# Patient Record
Sex: MALE | Race: WHITE | NOT HISPANIC OR LATINO | Employment: FULL TIME | URBAN - METROPOLITAN AREA
[De-identification: names, ages, dates, MRNs, and addresses within clinical notes are randomized per-mention and may not be internally consistent; named-entity substitution may affect disease eponyms.]

---

## 2019-08-30 DIAGNOSIS — Z00.00 ROUTINE GENERAL MEDICAL EXAMINATION AT A HEALTH CARE FACILITY: Primary | ICD-10-CM

## 2019-10-02 ENCOUNTER — CLINICAL SUPPORT (OUTPATIENT)
Dept: INTERNAL MEDICINE | Facility: CLINIC | Age: 59
End: 2019-10-02
Attending: INTERNAL MEDICINE
Payer: COMMERCIAL

## 2019-10-02 ENCOUNTER — HOSPITAL ENCOUNTER (OUTPATIENT)
Dept: RADIOLOGY | Facility: HOSPITAL | Age: 59
Discharge: HOME | End: 2019-10-02
Attending: INTERNAL MEDICINE
Payer: COMMERCIAL

## 2019-10-02 ENCOUNTER — HOSPITAL ENCOUNTER (OUTPATIENT)
Dept: CARDIOLOGY | Facility: CLINIC | Age: 59
Discharge: HOME | End: 2019-10-02
Attending: INTERNAL MEDICINE
Payer: COMMERCIAL

## 2019-10-02 ENCOUNTER — CLINICAL SUPPORT (OUTPATIENT)
Dept: INTERNAL MEDICINE | Facility: CLINIC | Age: 59
End: 2019-10-02
Payer: COMMERCIAL

## 2019-10-02 ENCOUNTER — OFFICE VISIT (OUTPATIENT)
Dept: INTERNAL MEDICINE | Facility: CLINIC | Age: 59
End: 2019-10-02
Payer: COMMERCIAL

## 2019-10-02 VITALS
HEIGHT: 72 IN | WEIGHT: 181 LBS | SYSTOLIC BLOOD PRESSURE: 138 MMHG | BODY MASS INDEX: 24.52 KG/M2 | HEART RATE: 79 BPM | OXYGEN SATURATION: 99 % | DIASTOLIC BLOOD PRESSURE: 90 MMHG

## 2019-10-02 DIAGNOSIS — E78.49 OTHER HYPERLIPIDEMIA: ICD-10-CM

## 2019-10-02 DIAGNOSIS — R94.39 ABNORMAL STRESS TEST: ICD-10-CM

## 2019-10-02 DIAGNOSIS — Z00.00 ROUTINE GENERAL MEDICAL EXAMINATION AT A HEALTH CARE FACILITY: Primary | ICD-10-CM

## 2019-10-02 DIAGNOSIS — Z00.00 ROUTINE GENERAL MEDICAL EXAMINATION AT A HEALTH CARE FACILITY: ICD-10-CM

## 2019-10-02 DIAGNOSIS — E04.9 ENLARGED THYROID: ICD-10-CM

## 2019-10-02 DIAGNOSIS — Z00.00 ROUTINE PHYSICAL EXAMINATION: Primary | ICD-10-CM

## 2019-10-02 LAB
ALBUMIN SERPL BCP-MCNC: 4.5 G/DL (ref 3.5–5.2)
ALP SERPL-CCNC: 99 U/L (ref 55–135)
ALT SERPL W/O P-5'-P-CCNC: 38 U/L (ref 10–44)
ANION GAP SERPL CALC-SCNC: 10 MMOL/L (ref 8–16)
AST SERPL-CCNC: 26 U/L (ref 10–40)
BILIRUB SERPL-MCNC: 0.4 MG/DL (ref 0.1–1)
BUN SERPL-MCNC: 16 MG/DL (ref 6–20)
CALCIUM SERPL-MCNC: 10.1 MG/DL (ref 8.7–10.5)
CHLORIDE SERPL-SCNC: 101 MMOL/L (ref 95–110)
CHOLEST SERPL-MCNC: 249 MG/DL (ref 120–199)
CHOLEST/HDLC SERPL: 5.7 {RATIO} (ref 2–5)
CO2 SERPL-SCNC: 26 MMOL/L (ref 23–29)
COMPLEXED PSA SERPL-MCNC: 1.8 NG/ML (ref 0–4)
CREAT SERPL-MCNC: 1 MG/DL (ref 0.5–1.4)
CV STRESS BASE HR: 68 BPM
DIASTOLIC BLOOD PRESSURE: 91 MMHG
ERYTHROCYTE [DISTWIDTH] IN BLOOD BY AUTOMATED COUNT: 13.1 % (ref 11.5–14.5)
EST. GFR  (AFRICAN AMERICAN): >60 ML/MIN/1.73 M^2
EST. GFR  (NON AFRICAN AMERICAN): >60 ML/MIN/1.73 M^2
ESTIMATED AVG GLUCOSE: 103 MG/DL (ref 68–131)
GLUCOSE SERPL-MCNC: 97 MG/DL (ref 70–110)
HBA1C MFR BLD HPLC: 5.2 % (ref 4–5.6)
HCT VFR BLD AUTO: 49 % (ref 40–54)
HCV AB SERPL QL IA: NEGATIVE
HDLC SERPL-MCNC: 44 MG/DL (ref 40–75)
HDLC SERPL: 17.7 % (ref 20–50)
HGB BLD-MCNC: 16.2 G/DL (ref 14–18)
HIV 1+2 AB+HIV1 P24 AG SERPL QL IA: NEGATIVE
LDLC SERPL CALC-MCNC: 186.6 MG/DL (ref 63–159)
MCH RBC QN AUTO: 29.3 PG (ref 27–31)
MCHC RBC AUTO-ENTMCNC: 33.1 G/DL (ref 32–36)
MCV RBC AUTO: 89 FL (ref 82–98)
NONHDLC SERPL-MCNC: 205 MG/DL
OHS CV CPX 1 MINUTE RECOVERY HEART RATE: 162 BPM
OHS CV CPX 85 PERCENT MAX PREDICTED HEART RATE MALE: 137
OHS CV CPX ESTIMATED METS: 16
OHS CV CPX MAX PREDICTED HEART RATE: 161
OHS CV CPX PATIENT IS FEMALE: 0
OHS CV CPX PATIENT IS MALE: 1
OHS CV CPX PEAK DIASTOLIC BLOOD PRESSURE: 64 MMHG
OHS CV CPX PEAK HEAR RATE: 181 BPM
OHS CV CPX PEAK RATE PRESSURE PRODUCT: NORMAL
OHS CV CPX PEAK SYSTOLIC BLOOD PRESSURE: 190 MMHG
OHS CV CPX PERCENT MAX PREDICTED HEART RATE ACHIEVED: 112
OHS CV CPX RATE PRESSURE PRODUCT PRESENTING: 9928
PLATELET # BLD AUTO: 344 K/UL (ref 150–350)
PMV BLD AUTO: 8.6 FL (ref 9.2–12.9)
POTASSIUM SERPL-SCNC: 4 MMOL/L (ref 3.5–5.1)
PROT SERPL-MCNC: 8 G/DL (ref 6–8.4)
RBC # BLD AUTO: 5.53 M/UL (ref 4.6–6.2)
SODIUM SERPL-SCNC: 137 MMOL/L (ref 136–145)
STRESS ECHO POST EXERCISE DUR MIN: 9 MINUTES
STRESS ECHO POST EXERCISE DUR SEC: 21 SECONDS
STRESS ECHO TARGET HR: 136.85 BPM
STRESS ST DEPRESSION: 2 MM
SYSTOLIC BLOOD PRESSURE: 146 MMHG
T4 FREE SERPL-MCNC: 1.07 NG/DL (ref 0.71–1.51)
TRIGL SERPL-MCNC: 92 MG/DL (ref 30–150)
TSH SERPL DL<=0.005 MIU/L-ACNC: 0.21 UIU/ML (ref 0.4–4)
WBC # BLD AUTO: 5.72 K/UL (ref 3.9–12.7)

## 2019-10-02 PROCEDURE — 99386 PREV VISIT NEW AGE 40-64: CPT | Mod: S$GLB,,, | Performed by: INTERNAL MEDICINE

## 2019-10-02 PROCEDURE — 99999 PR PBB SHADOW E&M-EST. PATIENT-LVL III: CPT | Mod: PBBFAC,,, | Performed by: INTERNAL MEDICINE

## 2019-10-02 PROCEDURE — 99386 PR PREVENTIVE VISIT,NEW,40-64: ICD-10-PCS | Mod: S$GLB,,, | Performed by: INTERNAL MEDICINE

## 2019-10-02 PROCEDURE — 99999 PR PBB SHADOW E&M-EST. PATIENT-LVL III: ICD-10-PCS | Mod: PBBFAC,,, | Performed by: INTERNAL MEDICINE

## 2019-10-02 PROCEDURE — 84153 ASSAY OF PSA TOTAL: CPT

## 2019-10-02 PROCEDURE — 85027 COMPLETE CBC AUTOMATED: CPT

## 2019-10-02 PROCEDURE — 86803 HEPATITIS C AB TEST: CPT

## 2019-10-02 PROCEDURE — 80061 LIPID PANEL: CPT

## 2019-10-02 PROCEDURE — 71046 XR CHEST PA AND LATERAL: ICD-10-PCS | Mod: 26,,, | Performed by: RADIOLOGY

## 2019-10-02 PROCEDURE — 71046 X-RAY EXAM CHEST 2 VIEWS: CPT | Mod: 26,,, | Performed by: RADIOLOGY

## 2019-10-02 PROCEDURE — 86703 HIV-1/HIV-2 1 RESULT ANTBDY: CPT

## 2019-10-02 PROCEDURE — 84439 ASSAY OF FREE THYROXINE: CPT

## 2019-10-02 PROCEDURE — 84443 ASSAY THYROID STIM HORMONE: CPT

## 2019-10-02 PROCEDURE — 93015 EXERCISE STRESS - EKG (CUPID ONLY): ICD-10-PCS | Mod: S$GLB,,, | Performed by: INTERNAL MEDICINE

## 2019-10-02 PROCEDURE — 93015 CV STRESS TEST SUPVJ I&R: CPT | Mod: S$GLB,,, | Performed by: INTERNAL MEDICINE

## 2019-10-02 PROCEDURE — 80053 COMPREHEN METABOLIC PANEL: CPT

## 2019-10-02 PROCEDURE — 71046 X-RAY EXAM CHEST 2 VIEWS: CPT | Mod: TC,FY

## 2019-10-02 PROCEDURE — 83036 HEMOGLOBIN GLYCOSYLATED A1C: CPT

## 2019-10-02 RX ORDER — BUTALBITAL, ASPIRIN, AND CAFFEINE 325; 50; 40 MG/1; MG/1; MG/1
1 CAPSULE ORAL EVERY 4 HOURS PRN
COMMUNITY

## 2019-10-02 NOTE — PROGRESS NOTES
Nutrition Assessment  Client name:  Alex Anderson    (Annual  physical)  :  1960  Age:  59 y.o.  Gender:  male    Client states:  Has history of elevated Cholesterol without medication and visits his PCP every 3 months for blood work. 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 This past month his travel days were higher than usual, and for lunch he dines at [employer] and orders the Crunch Wrap Supreme, whereas he can eat and drive in the car. A second fast food choice is [employer] and he orders Whopper with fries or onion rings. He is the cook in the family and shares that he prepares skinless chicken, one pot meals, uses olive oil for cooking and baking, and little salt added in cooking. He does use coconut oil infrequently should a recipe call for it. His wife does not like wheat bread, brown rice nor fish, so therefore he concedes, as he does not want to purchase 2 types of rice and bread. He grew up eating desserts after dinner, and as an adult enjoys baking from scratch and enjoys homemade or store purchased bakery items 4x/wk. Shares that dessert is often consumed between 9-10 pm which he states is an unhealthy practice. Has history of Diverticulitis in  and has decreased amount and frequency of nuts and chews them well. Currently is taking Adult and pre-briana vitamins as he bites his nails and were not growing, therefore his wife suggested he take pre-briana vitamins and now his nails are healthy. In high school he weighed 136# and shares that he has a fast metabolism, however would like to lose wt. and states that walking would assist him with accomplishing this goal.      Anthropometrics  Height:  6'     Weight:  181  BMI:  24.55  % Body Fat:  unknown    Clinical Signs/Symptoms  N/V/D:  none  Appetite (Good, Fair, or Poor):  good      PMH: Diverticulitis ,  Hyperlipidemia    No past surgical history on file.    Medications    has a current medication list which includes the following prescription(s): butalbital-aspirin-caffeine -40 mg.    Vitamins, Minerals, and/or Supplements:  Omega 3, 1280 mg, 50+ MVI, Daily fiber, Echinacea - 1800 mg, Vitamin C 1500 mg, L-arginine - 1000 mg, ASA, Zinc, Vitamin E, Prenatal, D3 - 50 mg, Magnesium    Food/Medication Interactions:  Reviewed     Food Allergies or Intolerances:  none     Social History    Marital status:    Employment:  [employer] -     Social History     Tobacco Use    Smoking status: No   Substance Use Topics    Alcohol use: 2 beers per year         Lab Reports   Total Cholesterol:  249    Triglycerides:  92  HDL:  44  LDL:  186.6   Glucose:  97  HbA1c:  5.2  BP:  138/90     Food History  Breakfast:  16 oz coffee + favored creamer  Mid-morning Snack:  water  Lunch:  Turkey and cheese on white, handful chips, water or crunchy wrap supreme or whopper with fries    Mid-afternoon Snack:  Protein bar or cookies  Dinner:  White bean chili with white rice, water  H.S. Snack:  Cake or cookies or cheese it snapz  *Fluid intake:  Coffee, water, Dr. Pepper every 2 wks    Exercise History:  No formal exercise plan. Climbs stairs 5-6x/day, mows lawn    Cultural/Spiritual/Personal Preferences:  None identified    Support System:  wife    State of Change:  contemplation    Barriers to Change:  Time constraints, enjoys fast food and denial of role in increasing lipids, lack of adequate exercise    Diagnosis    Altered nutrition related laboratory values related to fast food meals, imbalanced meals, and inadequate physical activity as evidenced by Chol: 249, LDL: 186.6 and HDL: 44.    Intervention    RMR (Method:  [facility]):  1675 kcal  Activity Factor:  1.3  PAULINE:  2178 - 125 = 2053    Goals:  1.  Discontinue 50+ MVI  2.  Daily fluid goal: 90 oz heathy fluids  3.  Consider brisk walking routine  of 150 minutes/weekly  4.  Decrease fat and sugar intake by 50%  5.  Utilize fast food guide as resource when ordering lunch on the go  6.  Evening snacking 4 hrs. Prior to bedtime - options discussed    Nutrition Education  Reviewed and explained laboratory results and noted elevations in Cholesterol, LDL and sup-optimal level of HDL. Following review, client stated that he has his blood tested every 3 months. Discussed sources of saturated and trans fats and healthier plant sources to improve Cholesterol and increase HDL. Explained that consumption of fast food is contributing to lipid elevations. Discussed the Fast Food and Lite Restaurant dining guides. Reviewed specifically healthier options from [employer] an [employer]. Encouraged choices from [employer] - client replied does not drink Smoothies that often. Explained the difference between daily activity and aerobic exercise and the benefit on lowering body fat and blood lipids. Has knowledge that whole grains and fish are better choices, however chooses to consume white rice/bread and no fish, except tuna salad occasionally. Explained a healthy margarine brand and why with emphasis that it is important to evaluate all types of fat in the margarine. Explained the Am. Heart Association's stance on coconut oil. Reviewed guidelines and brand of healthy protein snack bars to substitute for cakes and cookies and the role of fiber in food. Encouraged vegetables at lunch and dinner and client did not respond. Calculated daily fluid goal and explained the benefit of hydration to adipose loss. Suggested 2% cheese, and low sugar greek yogurt and good options for chips. Client was polite, however Interest and behavioral change is questionable, as observed client looking at his watch 3 times during the consultation.    Patient verbalized understanding of nutrition education and recommendations received.    Handouts Provided  Meal Planning Guide  Restaurant Guide  Eat  Fit Shopping List  Eat Fit Mary  Fast Food Guide  Vitamin/Mineral Guide  AAND - Choosing Heart Healthy Fats    Monitoring/Evaluation    Monitor the following:  Weight  BMI  % Body Fat  Caloric intake  Labs:  Lipids    Follow Up Plan:  Communication with referring healthcare provider is unnecessary at this time as patient presented as part of annual wellness exam.  However, will follow up with patient in 1-2 years.

## 2019-10-02 NOTE — LETTER
October 2, 2019    Alex Anderson  [address] [facility] [place] [address]             [facility]  [address]  [facility] [address]  Phone: [phone]  Fax: [phone] Dear Mr. Anderson:        Thank you for allowing me to serve you and perform your Executive Health exam on 10/2/2019.  This letter will serve a brief summary of the history, physical findings, and laboratory/studies performed and recommendations at that time.    Reason for Visit: Executive Health Preventive Physical Examination    Subjective:       Patient ID: Alex Anderson is a 59 y.o. male.    Chief Complaint: Executive Health    HPI:  Patient new to me, here for executive health physical.  59-year-old male from [facility] says he has a wonderful primary care doctor at home who has been seeing for over 15 years.  Patient says he has been in good shape, works hard.  Has a history of mild cholesterol and maybe a thyroid problem some years ago but has done well and felt well.  We updated personal and family history.    I reviewed his chest x-ray which showed no acute abnormalities.  Stress test however showed abnormal or positive for ischemia but there are comments that because of the patient's heavy workload on the test that this may be a false positive.  Patient's labs show mildly elevated cholesterol, mildly decreased TSH in stable free T4.  Other labs were stable or unremarkable.  Patient denies any problems with palpitations or diarrhea.  No chest pains or shortness of breath.  He says the stress test went well.  We discussed this at length including seeing Cardiology, doing further test on the thyroid and treating his lipids and borderline blood pressure he would like to review this with his primary doctor of over 15 years.  I do not feel the patient is in any acute distress and I think that is a reasonable plan. I will provide him with copies of all of his results now and through [facility].    Review  of Systems   Constitutional: Negative for chills, fatigue, fever and unexpected weight change.   HENT: Negative for nosebleeds and trouble swallowing.    Eyes: Negative for pain and visual disturbance.   Respiratory: Negative for cough, shortness of breath and wheezing.    Cardiovascular: Negative for chest pain and palpitations.   Gastrointestinal: Negative for abdominal pain, constipation, diarrhea, nausea and vomiting.   Genitourinary: Negative for difficulty urinating and hematuria.   Musculoskeletal: Negative for neck pain.   Skin: Negative for rash.   Neurological: Negative for dizziness and headaches.   Hematological: Does not bruise/bleed easily.   Psychiatric/Behavioral: Negative for dysphoric mood, sleep disturbance and suicidal ideas.       Objective:      Physical Exam   Constitutional: He is oriented to person, place, and time. He appears well-developed and well-nourished. No distress.   HENT:   Head: Normocephalic and atraumatic.   Right Ear: External ear normal.   Left Ear: External ear normal.   Mouth/Throat: Oropharynx is clear and moist. No oropharyngeal exudate.   TM's clear, pharynx clear   Eyes: Pupils are equal, round, and reactive to light. Conjunctivae and EOM are normal. No scleral icterus.   Neck: Normal range of motion. Neck supple. Thyromegaly (L greater than R) present.   No supraclavicular nodes palpated   Cardiovascular: Normal rate, regular rhythm and normal heart sounds.   No murmur heard.  Pulmonary/Chest: Effort normal and breath sounds normal. He has no wheezes.   Abdominal: Soft. Bowel sounds are normal. He exhibits no mass. There is no tenderness.   Musculoskeletal: He exhibits no edema.   Lymphadenopathy:     He has no cervical adenopathy.   Neurological: He is alert and oriented to person, place, and time.   Skin: No rash noted. No erythema. No pallor.   Psychiatric: He has a normal mood and affect. His behavior is normal.       Assessment:       1. Routine physical  examination    2. Other hyperlipidemia    3. Abnormal stress test    4. Enlarged thyroid        Plan:       Alex was seen today for [facility].    Diagnoses and all orders for this visit:    Routine physical examination    Other hyperlipidemia    Abnormal stress test    Enlarged thyroid          Patient decided to discuss this with his PCP.  May need repeat thyroid studies, thyroid ultrasound, treatment of lipids, follow-up of blood pressure and possible Cardiology consult versus other studies to evaluate abnormal stress test.     Labs:  Results for orders placed or performed in visit on 10/02/19   Comprehensive metabolic panel   Result Value Ref Range    Sodium 137 136 - 145 mmol/L    Potassium 4.0 3.5 - 5.1 mmol/L    Chloride 101 95 - 110 mmol/L    CO2 26 23 - 29 mmol/L    Glucose 97 70 - 110 mg/dL    BUN, Bld 16 6 - 20 mg/dL    Creatinine 1.0 0.5 - 1.4 mg/dL    Calcium 10.1 8.7 - 10.5 mg/dL    Total Protein 8.0 6.0 - 8.4 g/dL    Albumin 4.5 3.5 - 5.2 g/dL    Total Bilirubin 0.4 0.1 - 1.0 mg/dL    Alkaline Phosphatase 99 55 - 135 U/L    AST 26 10 - 40 U/L    ALT 38 10 - 44 U/L    Anion Gap 10 8 - 16 mmol/L    eGFR if African American >60.0 >60 mL/min/1.73 m^2    eGFR if non African American >60.0 >60 mL/min/1.73 m^2   CBC Without Differential   Result Value Ref Range    WBC 5.72 3.90 - 12.70 K/uL    RBC 5.53 4.60 - 6.20 M/uL    Hemoglobin 16.2 14.0 - 18.0 g/dL    Hematocrit 49.0 40.0 - 54.0 %    Mean Corpuscular Volume 89 82 - 98 fL    Mean Corpuscular Hemoglobin 29.3 27.0 - 31.0 pg    Mean Corpuscular Hemoglobin Conc 33.1 32.0 - 36.0 g/dL    RDW 13.1 11.5 - 14.5 %    Platelets 344 150 - 350 K/uL    MPV 8.6 (L) 9.2 - 12.9 fL   Lipid panel   Result Value Ref Range    Cholesterol 249 (H) 120 - 199 mg/dL    Triglycerides 92 30 - 150 mg/dL    HDL 44 40 - 75 mg/dL    LDL Cholesterol 186.6 (H) 63.0 - 159.0 mg/dL    Hdl/Cholesterol Ratio 17.7 (L) 20.0 - 50.0 %    Total Cholesterol/HDL Ratio 5.7 (H) 2.0 - 5.0     Non-HDL Cholesterol 205 mg/dL   TSH   Result Value Ref Range    TSH 0.209 (L) 0.400 - 4.000 uIU/mL   PSA, Screening (every year)   Result Value Ref Range    PSA, SCREEN 1.8 0.00 - 4.00 ng/mL   Hemoglobin A1c   Result Value Ref Range    Hemoglobin A1C 5.2 4.0 - 5.6 %    Estimated Avg Glucose 103 68 - 131 mg/dL   Hepatitis C antibody   Result Value Ref Range    Hepatitis C Ab Negative Negative   HIV 1/2 Ag/Ab (4th Gen)   Result Value Ref Range    HIV 1/2 Ag/Ab Negative Negative   T4, free   Result Value Ref Range    Free T4 1.07 0.71 - 1.51 ng/dL          Assessment/Recommendations:  Routine Health Maintenance    At this time, you appear to be in good medical condition.  I look forward to seeing you again next year.  Please contact me should you have any questions or concerns regarding physical findings, or my recommendations.              If you have any questions or concerns, please don't hesitate to call.    Sincerely,        Lobito Ramos MD

## 2019-10-02 NOTE — PROGRESS NOTES
Subjective:       Patient ID: Alex Anderson is a 59 y.o. male.    Chief Complaint: Executive Health    HPI:  Patient new to me, here for [facility] physical.  59-year-old male from [facility] says he has a wonderful primary care doctor at home who has been seeing for over 15 years.  Patient says he has been in good shape, works hard.  Has a history of mild cholesterol and maybe a thyroid problem some years ago but has done well and felt well.  We updated personal and family history.    I reviewed his chest x-ray which showed no acute abnormalities.  Stress test however showed abnormal or positive for ischemia but there are comments that because of the patient's heavy workload on the test that this may be a false positive.  Patient's labs show mildly elevated cholesterol, mildly decreased TSH in stable free T4.  Other labs were stable or unremarkable.  Patient denies any problems with palpitations or diarrhea.  No chest pains or shortness of breath.  He says the stress test went well.  We discussed this at length including seeing Cardiology, doing further test on the thyroid and treating his lipids and borderline blood pressure he would like to review this with his primary doctor of over 15 years.  I do not feel the patient is in any acute distress and I think that is a reasonable plan. I will provide him with copies of all of his results now and through [facility].    Review of Systems   Constitutional: Negative for chills, fatigue, fever and unexpected weight change.   HENT: Negative for nosebleeds and trouble swallowing.    Eyes: Negative for pain and visual disturbance.   Respiratory: Negative for cough, shortness of breath and wheezing.    Cardiovascular: Negative for chest pain and palpitations.   Gastrointestinal: Negative for abdominal pain, constipation, diarrhea, nausea and vomiting.   Genitourinary: Negative for difficulty urinating and hematuria.   Musculoskeletal: Negative for neck  pain.   Skin: Negative for rash.   Neurological: Negative for dizziness and headaches.   Hematological: Does not bruise/bleed easily.   Psychiatric/Behavioral: Negative for dysphoric mood, sleep disturbance and suicidal ideas.       Objective:      Physical Exam   Constitutional: He is oriented to person, place, and time. He appears well-developed and well-nourished. No distress.   HENT:   Head: Normocephalic and atraumatic.   Right Ear: External ear normal.   Left Ear: External ear normal.   Mouth/Throat: Oropharynx is clear and moist. No oropharyngeal exudate.   TM's clear, pharynx clear   Eyes: Pupils are equal, round, and reactive to light. Conjunctivae and EOM are normal. No scleral icterus.   Neck: Normal range of motion. Neck supple. Thyromegaly (L greater than R) present.   No supraclavicular nodes palpated   Cardiovascular: Normal rate, regular rhythm and normal heart sounds.   No murmur heard.  Pulmonary/Chest: Effort normal and breath sounds normal. He has no wheezes.   Abdominal: Soft. Bowel sounds are normal. He exhibits no mass. There is no tenderness.   Musculoskeletal: He exhibits no edema.   Lymphadenopathy:     He has no cervical adenopathy.   Neurological: He is alert and oriented to person, place, and time.   Skin: No rash noted. No erythema. No pallor.   Psychiatric: He has a normal mood and affect. His behavior is normal.       Assessment:       1. Routine physical examination    2. Other hyperlipidemia    3. Abnormal stress test    4. Enlarged thyroid        Plan:       Alex was seen today for [facility].    Diagnoses and all orders for this visit:    Routine physical examination    Other hyperlipidemia    Abnormal stress test    Enlarged thyroid          Patient decided to discuss this with his PCP.  May need repeat thyroid studies, thyroid ultrasound, treatment of lipids, follow-up of blood pressure and possible Cardiology consult versus other studies to evaluate abnormal stress  test.

## 2020-12-30 ENCOUNTER — CLINICAL SUPPORT (OUTPATIENT)
Dept: INTERNAL MEDICINE | Facility: CLINIC | Age: 60
End: 2020-12-30
Payer: COMMERCIAL

## 2020-12-30 ENCOUNTER — OFFICE VISIT (OUTPATIENT)
Dept: INTERNAL MEDICINE | Facility: CLINIC | Age: 60
End: 2020-12-30
Payer: COMMERCIAL

## 2020-12-30 ENCOUNTER — PATIENT MESSAGE (OUTPATIENT)
Dept: INTERNAL MEDICINE | Facility: CLINIC | Age: 60
End: 2020-12-30

## 2020-12-30 ENCOUNTER — HOSPITAL ENCOUNTER (OUTPATIENT)
Dept: CARDIOLOGY | Facility: CLINIC | Age: 60
Discharge: HOME | End: 2020-12-30
Payer: COMMERCIAL

## 2020-12-30 VITALS
OXYGEN SATURATION: 99 % | WEIGHT: 182.75 LBS | HEIGHT: 72 IN | SYSTOLIC BLOOD PRESSURE: 120 MMHG | DIASTOLIC BLOOD PRESSURE: 90 MMHG | BODY MASS INDEX: 24.75 KG/M2 | HEART RATE: 66 BPM

## 2020-12-30 DIAGNOSIS — Z00.00 ROUTINE PHYSICAL EXAMINATION: Primary | ICD-10-CM

## 2020-12-30 DIAGNOSIS — R97.20 ELEVATED PSA: ICD-10-CM

## 2020-12-30 DIAGNOSIS — E78.49 OTHER HYPERLIPIDEMIA: ICD-10-CM

## 2020-12-30 DIAGNOSIS — Z00.00 ANNUAL PHYSICAL EXAM: Primary | ICD-10-CM

## 2020-12-30 DIAGNOSIS — Z00.00 ROUTINE GENERAL MEDICAL EXAMINATION AT A HEALTH CARE FACILITY: ICD-10-CM

## 2020-12-30 DIAGNOSIS — Z00.00 ROUTINE GENERAL MEDICAL EXAMINATION AT A HEALTH CARE FACILITY: Primary | ICD-10-CM

## 2020-12-30 LAB
ALBUMIN SERPL BCP-MCNC: 4.2 G/DL (ref 3.5–5.2)
ALP SERPL-CCNC: 95 U/L (ref 55–135)
ALT SERPL W/O P-5'-P-CCNC: 29 U/L (ref 10–44)
ANION GAP SERPL CALC-SCNC: 7 MMOL/L (ref 8–16)
AST SERPL-CCNC: 24 U/L (ref 10–40)
BILIRUB SERPL-MCNC: 0.6 MG/DL (ref 0.1–1)
BUN SERPL-MCNC: 18 MG/DL (ref 6–20)
CALCIUM SERPL-MCNC: 9.3 MG/DL (ref 8.7–10.5)
CHLORIDE SERPL-SCNC: 102 MMOL/L (ref 95–110)
CHOLEST SERPL-MCNC: 205 MG/DL (ref 120–199)
CHOLEST/HDLC SERPL: 3.9 {RATIO} (ref 2–5)
CO2 SERPL-SCNC: 26 MMOL/L (ref 23–29)
COMPLEXED PSA SERPL-MCNC: 6.1 NG/ML (ref 0–4)
CREAT SERPL-MCNC: 1 MG/DL (ref 0.5–1.4)
ERYTHROCYTE [DISTWIDTH] IN BLOOD BY AUTOMATED COUNT: 13.3 % (ref 11.5–14.5)
EST. GFR  (AFRICAN AMERICAN): >60 ML/MIN/1.73 M^2
EST. GFR  (NON AFRICAN AMERICAN): >60 ML/MIN/1.73 M^2
ESTIMATED AVG GLUCOSE: 105 MG/DL (ref 68–131)
GLUCOSE SERPL-MCNC: 99 MG/DL (ref 70–110)
HBA1C MFR BLD HPLC: 5.3 % (ref 4–5.6)
HCT VFR BLD AUTO: 47.7 % (ref 40–54)
HCV AB SERPL QL IA: NEGATIVE
HDLC SERPL-MCNC: 52 MG/DL (ref 40–75)
HDLC SERPL: 25.4 % (ref 20–50)
HGB BLD-MCNC: 15.4 G/DL (ref 14–18)
LDLC SERPL CALC-MCNC: 137.8 MG/DL (ref 63–159)
MCH RBC QN AUTO: 29 PG (ref 27–31)
MCHC RBC AUTO-ENTMCNC: 32.3 G/DL (ref 32–36)
MCV RBC AUTO: 90 FL (ref 82–98)
NONHDLC SERPL-MCNC: 153 MG/DL
PLATELET # BLD AUTO: 345 K/UL (ref 150–350)
PMV BLD AUTO: 8.8 FL (ref 9.2–12.9)
POTASSIUM SERPL-SCNC: 4.1 MMOL/L (ref 3.5–5.1)
PROT SERPL-MCNC: 7.6 G/DL (ref 6–8.4)
RBC # BLD AUTO: 5.31 M/UL (ref 4.6–6.2)
SODIUM SERPL-SCNC: 135 MMOL/L (ref 136–145)
T4 FREE SERPL-MCNC: 1.09 NG/DL (ref 0.71–1.51)
TRIGL SERPL-MCNC: 76 MG/DL (ref 30–150)
TSH SERPL DL<=0.005 MIU/L-ACNC: 0.23 UIU/ML (ref 0.4–4)
WBC # BLD AUTO: 6.17 K/UL (ref 3.9–12.7)

## 2020-12-30 PROCEDURE — 83036 HEMOGLOBIN GLYCOSYLATED A1C: CPT

## 2020-12-30 PROCEDURE — 84439 ASSAY OF FREE THYROXINE: CPT

## 2020-12-30 PROCEDURE — 97750 PHYSICAL PERFORMANCE TEST: CPT | Mod: S$GLB,,, | Performed by: INTERNAL MEDICINE

## 2020-12-30 PROCEDURE — 97750 PR PHYSICAL PERFORMANCE TEST: ICD-10-PCS | Mod: S$GLB,,, | Performed by: INTERNAL MEDICINE

## 2020-12-30 PROCEDURE — 99999 PR PBB SHADOW E&M-EST. PATIENT-LVL I: CPT | Mod: PBBFAC,,,

## 2020-12-30 PROCEDURE — 80061 LIPID PANEL: CPT

## 2020-12-30 PROCEDURE — 84153 ASSAY OF PSA TOTAL: CPT

## 2020-12-30 PROCEDURE — 3008F BODY MASS INDEX DOCD: CPT | Mod: CPTII,S$GLB,, | Performed by: INTERNAL MEDICINE

## 2020-12-30 PROCEDURE — 85027 COMPLETE CBC AUTOMATED: CPT

## 2020-12-30 PROCEDURE — 99396 PR PREVENTIVE VISIT,EST,40-64: ICD-10-PCS | Mod: S$GLB,,, | Performed by: INTERNAL MEDICINE

## 2020-12-30 PROCEDURE — 99999 PR PBB SHADOW E&M-EST. PATIENT-LVL III: ICD-10-PCS | Mod: PBBFAC,,, | Performed by: INTERNAL MEDICINE

## 2020-12-30 PROCEDURE — 93005 EKG 12-LEAD: ICD-10-PCS | Mod: S$GLB,,, | Performed by: INTERNAL MEDICINE

## 2020-12-30 PROCEDURE — 93005 ELECTROCARDIOGRAM TRACING: CPT | Mod: S$GLB,,, | Performed by: INTERNAL MEDICINE

## 2020-12-30 PROCEDURE — 36415 COLL VENOUS BLD VENIPUNCTURE: CPT

## 2020-12-30 PROCEDURE — 86803 HEPATITIS C AB TEST: CPT

## 2020-12-30 PROCEDURE — 99999 PR PBB SHADOW E&M-EST. PATIENT-LVL I: ICD-10-PCS | Mod: PBBFAC,,,

## 2020-12-30 PROCEDURE — 93010 ELECTROCARDIOGRAM REPORT: CPT | Mod: S$GLB,,, | Performed by: INTERNAL MEDICINE

## 2020-12-30 PROCEDURE — 1126F AMNT PAIN NOTED NONE PRSNT: CPT | Mod: S$GLB,,, | Performed by: INTERNAL MEDICINE

## 2020-12-30 PROCEDURE — 3008F PR BODY MASS INDEX (BMI) DOCUMENTED: ICD-10-PCS | Mod: CPTII,S$GLB,, | Performed by: INTERNAL MEDICINE

## 2020-12-30 PROCEDURE — 99396 PREV VISIT EST AGE 40-64: CPT | Mod: S$GLB,,, | Performed by: INTERNAL MEDICINE

## 2020-12-30 PROCEDURE — 80053 COMPREHEN METABOLIC PANEL: CPT

## 2020-12-30 PROCEDURE — 1126F PR PAIN SEVERITY QUANTIFIED, NO PAIN PRESENT: ICD-10-PCS | Mod: S$GLB,,, | Performed by: INTERNAL MEDICINE

## 2020-12-30 PROCEDURE — 84443 ASSAY THYROID STIM HORMONE: CPT

## 2020-12-30 PROCEDURE — 99999 PR PBB SHADOW E&M-EST. PATIENT-LVL III: CPT | Mod: PBBFAC,,, | Performed by: INTERNAL MEDICINE

## 2020-12-30 PROCEDURE — 93010 EKG 12-LEAD: ICD-10-PCS | Mod: S$GLB,,, | Performed by: INTERNAL MEDICINE

## 2020-12-30 RX ORDER — ATORVASTATIN CALCIUM 10 MG/1
TABLET, FILM COATED ORAL
COMMUNITY
Start: 2020-04-03

## 2020-12-30 NOTE — LETTER
December 30, 2020    Alex Anderson  [address] [facility] [place] [address]             [facility]  [address]  [facility] [address]  Phone: [phone]  Fax: [phone] Dear Mr. Anderson:        Thank you for allowing me to serve you and perform your Executive Health exam on 12/30/2020.  This letter will serve a brief summary of the history, physical findings, and laboratory/studies performed and recommendations at that time.    Reason for Visit: Executive Health Preventive Physical Examination    Subjective:       Patient ID: Alex Anderson is a 60 y.o. male.    Chief Complaint: Executive Health    Patient here for executive health physical.  He recently also had a checkup with his PCP and is seeing a urologist in a few weeks because his PSA was elevated.  His PSA at the time was 6.7.  It is 6.1 today and he was not treated in any way.  He says he did have intercourse the night before the 1st blood test was drawn and he was told that could have played a role.  I suspect should have improved a good bit more by now but he is plugged in with urology so we will see.  Lipids are improved back on cholesterol medicine.  Other labs are stable although we are waiting on his hepatitis-C screening.  Preliminary EKG is also normal and unchanged.  No problems with the COVID pandemic.  He is primarily working from home    Review of Systems   Constitutional: Negative for chills, fatigue and fever.   HENT: Negative for nosebleeds and trouble swallowing.    Eyes: Negative for pain and visual disturbance.   Respiratory: Negative for cough, shortness of breath and wheezing.    Cardiovascular: Negative for chest pain and palpitations.   Gastrointestinal: Negative for abdominal pain, constipation, diarrhea, nausea and vomiting.   Genitourinary: Negative for difficulty urinating and hematuria.   Musculoskeletal: Negative for arthralgias, back pain and neck pain.   Integumentary:  Negative for rash.    Neurological: Negative for dizziness and headaches.   Hematological: Does not bruise/bleed easily.   Psychiatric/Behavioral: Negative for dysphoric mood and sleep disturbance.         Objective:      Physical Exam  Constitutional:       General: He is not in acute distress.     Appearance: He is well-developed.   HENT:      Head: Normocephalic and atraumatic.      Right Ear: Tympanic membrane, ear canal and external ear normal.      Left Ear: Tympanic membrane, ear canal and external ear normal.      Mouth/Throat:      Pharynx: No oropharyngeal exudate or posterior oropharyngeal erythema.   Eyes:      General: No scleral icterus.     Conjunctiva/sclera: Conjunctivae normal.      Pupils: Pupils are equal, round, and reactive to light.   Neck:      Musculoskeletal: Normal range of motion and neck supple.      Thyroid: No thyromegaly.      Comments: No supraclavicular nodes palpated  Cardiovascular:      Rate and Rhythm: Normal rate and regular rhythm.      Pulses: Normal pulses.      Heart sounds: Normal heart sounds. No murmur.   Pulmonary:      Effort: Pulmonary effort is normal.      Breath sounds: Normal breath sounds. No wheezing.   Abdominal:      General: Bowel sounds are normal.      Palpations: Abdomen is soft. There is no mass.      Tenderness: There is no abdominal tenderness.   Musculoskeletal:         General: No tenderness.      Right lower leg: No edema.      Left lower leg: No edema.   Lymphadenopathy:      Cervical: No cervical adenopathy.   Skin:     Coloration: Skin is not jaundiced or pale.   Neurological:      General: No focal deficit present.      Mental Status: He is alert and oriented to person, place, and time.   Psychiatric:         Mood and Affect: Mood normal.         Behavior: Behavior normal.         Assessment:       1. Routine physical examination    2. Elevated PSA    3. Other hyperlipidemia        Plan:       Alex was seen today for [facility].    Diagnoses and all orders  for this visit:    Routine physical examination    Elevated PSA  Comments:  Seeing Urology as this is a new finding. Was 6.7 4 weeks ago. No infection or antibiotics found or given.     Other hyperlipidemia  Comments:  Lower on Lipitor          continue current meds.  Keep appointment for urology next week.  Review final EKG report.  Continue annual follow-up    EKG and hepatitis-C screening were unremarkable     Labs:  Results for orders placed or performed in visit on 12/30/20   Comprehensive metabolic panel   Result Value Ref Range    Sodium 135 (L) 136 - 145 mmol/L    Potassium 4.1 3.5 - 5.1 mmol/L    Chloride 102 95 - 110 mmol/L    CO2 26 23 - 29 mmol/L    Glucose 99 70 - 110 mg/dL    BUN 18 6 - 20 mg/dL    Creatinine 1.0 0.5 - 1.4 mg/dL    Calcium 9.3 8.7 - 10.5 mg/dL    Total Protein 7.6 6.0 - 8.4 g/dL    Albumin 4.2 3.5 - 5.2 g/dL    Total Bilirubin 0.6 0.1 - 1.0 mg/dL    Alkaline Phosphatase 95 55 - 135 U/L    AST 24 10 - 40 U/L    ALT 29 10 - 44 U/L    Anion Gap 7 (L) 8 - 16 mmol/L    eGFR if African American >60.0 >60 mL/min/1.73 m^2    eGFR if non African American >60.0 >60 mL/min/1.73 m^2   CBC Without Differential   Result Value Ref Range    WBC 6.17 3.90 - 12.70 K/uL    RBC 5.31 4.60 - 6.20 M/uL    Hemoglobin 15.4 14.0 - 18.0 g/dL    Hematocrit 47.7 40.0 - 54.0 %    MCV 90 82 - 98 fL    MCH 29.0 27.0 - 31.0 pg    MCHC 32.3 32.0 - 36.0 g/dL    RDW 13.3 11.5 - 14.5 %    Platelets 345 150 - 350 K/uL    MPV 8.8 (L) 9.2 - 12.9 fL   Lipid panel   Result Value Ref Range    Cholesterol 205 (H) 120 - 199 mg/dL    Triglycerides 76 30 - 150 mg/dL    HDL 52 40 - 75 mg/dL    LDL Cholesterol 137.8 63.0 - 159.0 mg/dL    HDL/Cholesterol Ratio 25.4 20.0 - 50.0 %    Total Cholesterol/HDL Ratio 3.9 2.0 - 5.0    Non-HDL Cholesterol 153 mg/dL   TSH   Result Value Ref Range    TSH 0.230 (L) 0.400 - 4.000 uIU/mL   PSA, Screening (every year)   Result Value Ref Range    PSA, Screen 6.1 (H) 0.00 - 4.00 ng/mL   Hemoglobin A1c    Result Value Ref Range    Hemoglobin A1C 5.3 4.0 - 5.6 %    Estimated Avg Glucose 105 68 - 131 mg/dL   Hepatitis C Antibody   Result Value Ref Range    Hepatitis C Ab Negative Negative   T4, Free   Result Value Ref Range    Free T4 1.09 0.71 - 1.51 ng/dL        Assessment/Recommendations:  Routine Health Maintenance    At this time, you appear to be in good medical condition.  I look forward to seeing you again next year.  Please contact me should you have any questions or concerns regarding physical findings, or my recommendations.              If you have any questions or concerns, please don't hesitate to call.    Sincerely,        Lobito Ramos MD

## 2020-12-30 NOTE — PROGRESS NOTES
Subjective:       Patient ID: Alex Anderson is a 60 y.o. male.    Chief Complaint: Executive Health    Patient here for executive health physical.  He recently also had a checkup with his PCP and is seeing a urologist in a few weeks because his PSA was elevated.  His PSA at the time was 6.7.  It is 6.1 today and he was not treated in any way.  He says he did have intercourse the night before the 1st blood test was drawn and he was told that could have played a role.  I suspect should have improved a good bit more by now but he is plugged in with urology so we will see.  Lipids are improved back on cholesterol medicine.  Other labs are stable although we are waiting on his hepatitis-C screening.  Preliminary EKG is also normal and unchanged.  No problems with the COVID pandemic.  He is primarily working from home    Review of Systems   Constitutional: Negative for chills, fatigue and fever.   HENT: Negative for nosebleeds and trouble swallowing.    Eyes: Negative for pain and visual disturbance.   Respiratory: Negative for cough, shortness of breath and wheezing.    Cardiovascular: Negative for chest pain and palpitations.   Gastrointestinal: Negative for abdominal pain, constipation, diarrhea, nausea and vomiting.   Genitourinary: Negative for difficulty urinating and hematuria.   Musculoskeletal: Negative for arthralgias, back pain and neck pain.   Integumentary:  Negative for rash.   Neurological: Negative for dizziness and headaches.   Hematological: Does not bruise/bleed easily.   Psychiatric/Behavioral: Negative for dysphoric mood and sleep disturbance.         Objective:      Physical Exam  Constitutional:       General: He is not in acute distress.     Appearance: He is well-developed.   HENT:      Head: Normocephalic and atraumatic.      Right Ear: Tympanic membrane, ear canal and external ear normal.      Left Ear: Tympanic membrane, ear canal and external ear normal.      Mouth/Throat:      Pharynx: No  oropharyngeal exudate or posterior oropharyngeal erythema.   Eyes:      General: No scleral icterus.     Conjunctiva/sclera: Conjunctivae normal.      Pupils: Pupils are equal, round, and reactive to light.   Neck:      Musculoskeletal: Normal range of motion and neck supple.      Thyroid: No thyromegaly.      Comments: No supraclavicular nodes palpated  Cardiovascular:      Rate and Rhythm: Normal rate and regular rhythm.      Pulses: Normal pulses.      Heart sounds: Normal heart sounds. No murmur.   Pulmonary:      Effort: Pulmonary effort is normal.      Breath sounds: Normal breath sounds. No wheezing.   Abdominal:      General: Bowel sounds are normal.      Palpations: Abdomen is soft. There is no mass.      Tenderness: There is no abdominal tenderness.   Musculoskeletal:         General: No tenderness.      Right lower leg: No edema.      Left lower leg: No edema.   Lymphadenopathy:      Cervical: No cervical adenopathy.   Skin:     Coloration: Skin is not jaundiced or pale.   Neurological:      General: No focal deficit present.      Mental Status: He is alert and oriented to person, place, and time.   Psychiatric:         Mood and Affect: Mood normal.         Behavior: Behavior normal.         Assessment:       1. Routine physical examination    2. Elevated PSA    3. Other hyperlipidemia        Plan:       Alex was seen today for [employer].    Diagnoses and all orders for this visit:    Routine physical examination    Elevated PSA  Comments:  Seeing Urology as this is a new finding. Was 6.7 4 weeks ago. No infection or antibiotics found or given.     Other hyperlipidemia  Comments:  Lower on Lipitor          continue current meds.  Keep appointment for urology next week.  Review final EKG report.  Continue annual follow-up    EKG and hepatitis-C screening were unremarkable

## 2020-12-30 NOTE — PROGRESS NOTES
"Nutrition Assessment  Client name:  Alex Anderson   (Annual  physical)  :  1960  Age:  60 y.o.  Gender:  male    Client states:  In last week was selected to work from home permanently. Due to Covid has not been traveling by plane and less dining out of fast food restaurants. Enjoys salami and will limit the intake and splurges at [employer] on the [place] sub. Shares that his physical activity and food choices have not been negatively impacted with Covid. Manges his Diverticulitis with  Food and [place] fiber capsules daily and has been free of any episodes. Primary protein of choice is skinless, boneless chicken breasts which he grills on the Green egg or mixes with Ria rice or pasta for a one meal dish. Since March he and his wife decreased caffeine intake and has replaced with coffee with herbal green tea and water, maybe a soda per week. Inherited his sweet tooth from his Dad and grew up having sweets after meals. Adds that he has "a sweet tooth from [place]". Nevertheless has reduced his intake of sugar and sweets since last visit. In April last year he was placed on Lipitor and has reduced the dose to 10 mg 3xwk by employing all of the changes mentioned above. Notices that when he does not take his vitamins and supplements, around 2 pm he will began to feel sluggish. His goals by next visit are to Continue with mindful eating, remain active and have healthy lipid values.         Anthropometrics  Height:  6'     Weight:  182#  BMI:  24.79  % Body Fat:  unknown    Clinical Signs/Symptoms  N/V/D:  none  Appetite:  good       PMH: Diverticulitis , Hyperlipidemia    Past Surgical History:   Procedure Laterality Date    FOOT SURGERY      Fork lift accident       Medications    has a current medication list which includes the following prescription(s): butalbital-aspirin-caffeine -40 mg.    Vitamins, Minerals, and/or Supplements: Omega 3, 1280 mg, 50+ MVI, Daily fiber, Echinacea - 1800 mg, Vitamin C " 1500 mg, ASA, Zinc, Vitamin E,  D3 - 50 mg, Magnesium      Food/Medication Interactions:  Reviewed     Food Allergies or Intolerances:  none     Social History    Marital status:    Employment:  [employer]     Social History     Tobacco Use    Smoking status: Never Smoker    Smokeless tobacco: Never Used   Substance Use Topics    Alcohol use: Yes     Frequency: 2 beers per yr        Lab Reports (Lipids unavailable at time of visit)  Total Cholesterol:  205    Triglycerides:  76  HDL:  52  LDL:  137.8   Glucose:  99  HbA1c:  5.3  BP Readings from Last 1 Encounters:   10/02/19 (!) 138/90       Food History  Breakfast:   16 oz herbal green tea with honey  Mid-morning Snack:  none  Lunch:  [place] a d cheese sandwich on wheat or check and dumpling soup  Mid-afternoon Snack:  Herbal tea  Dinner:  Chicken breast with Ria pasta  H.S. Snack:  Herbal tea, cookies or potato chips or Pb crackers or 3 squares [place] bar  *Fluid intake:  Herbal tea, water, ETOH  Cooks with olive oil    Exercise History:  1-2x/wk physical work remodeling house, 7- 9,000 steps daily    Cultural/Spiritual/Personal Preferences:  None identified    Support System:  spouse    State of Change:  Preparation    Barriers to Change:  Increasing cooked vegetables and fruits, thinks current exercise is adequate    Diagnosis    Altered nutrition related laboratory values related to imbalanced meals and improper food choices as evidenced by Chol: 205 and LDL:137.8.    Intervention    RMR (Method:  InBody):  1675 kcal  Activity Factor:  1.3    PAULINE:  2177 kcal    Goals:  1.  Continue with mindful food choices  2.  Consider protein snack bars at breakfast, snack or as replacement when lunch is skipped  3.  Consider increasing vegetable and fruit intake by 50% and aerobic exercise    Nutrition Education  Reviewed and explained laboratory values available at time of visit. Complimented client on reduction of sugar and caffeine and  awareness of lower cholesterol foods. Discussed the relationship of sugar consumption and Cholesterol reading. Explained foods and exercise that will increase HDL levels. Noted few fruits and vegetables in diet history and encouraged increase although client mentioned he does not like cooked veggies, does eat raw + salads. Explained the benefits of protein snack bars for his sweet tooth, breakfast or lunch and provided brand names. Client appears interested.     Patient verbalized understanding of nutrition education and recommendations received.    Handouts Provided  Meal Planning Guide  Restaurant Guide  Eat Fit Shopping List  Eat Fit Mary  Fast Food Guide  Vitamin/Mineral Guide    Monitoring/Evaluation    Monitor the following:  Weight  BMI  % Body Fat  Caloric intake  Labs:  Lipids    Follow Up Plan:  Communication with referring healthcare provider is unnecessary at this time as patient presented as part of annual wellness exam.  However, will follow up with patient in 1-2 years.

## 2022-08-31 ENCOUNTER — CLINICAL SUPPORT (OUTPATIENT)
Dept: OTHER | Facility: CLINIC | Age: 62
End: 2022-08-31
Payer: COMMERCIAL

## 2022-08-31 DIAGNOSIS — Z00.8 ENCOUNTER FOR OTHER GENERAL EXAMINATION: ICD-10-CM

## 2022-09-07 VITALS
SYSTOLIC BLOOD PRESSURE: 141 MMHG | DIASTOLIC BLOOD PRESSURE: 92 MMHG | WEIGHT: 187 LBS | HEIGHT: 72 IN | BODY MASS INDEX: 25.33 KG/M2

## 2022-09-07 LAB
HDLC SERPL-MCNC: 38 MG/DL
POC CHOLESTEROL, LDL (DOCK): 131 MG/DL
POC CHOLESTEROL, TOTAL: 187 MG/DL
POC GLUCOSE, FASTING: 88 MG/DL (ref 60–110)
TRIGL SERPL-MCNC: 100 MG/DL

## 2023-04-24 DIAGNOSIS — Z00.00 ANNUAL PHYSICAL EXAM: Primary | ICD-10-CM

## 2023-05-11 ENCOUNTER — CLINICAL SUPPORT (OUTPATIENT)
Dept: INTERNAL MEDICINE | Facility: CLINIC | Age: 63
End: 2023-05-11
Attending: INTERNAL MEDICINE
Payer: COMMERCIAL

## 2023-05-11 ENCOUNTER — CLINICAL SUPPORT (OUTPATIENT)
Dept: CARDIOLOGY | Facility: HOSPITAL | Age: 63
End: 2023-05-11
Attending: INTERNAL MEDICINE
Payer: COMMERCIAL

## 2023-05-11 ENCOUNTER — OFFICE VISIT (OUTPATIENT)
Dept: FAMILY MEDICINE | Facility: CLINIC | Age: 63
End: 2023-05-11
Attending: INTERNAL MEDICINE
Payer: COMMERCIAL

## 2023-05-11 VITALS
WEIGHT: 195.13 LBS | DIASTOLIC BLOOD PRESSURE: 92 MMHG | OXYGEN SATURATION: 96 % | HEART RATE: 71 BPM | SYSTOLIC BLOOD PRESSURE: 146 MMHG | BODY MASS INDEX: 26.43 KG/M2 | HEIGHT: 72 IN | TEMPERATURE: 99 F

## 2023-05-11 DIAGNOSIS — Z00.00 ANNUAL PHYSICAL EXAM: Primary | ICD-10-CM

## 2023-05-11 DIAGNOSIS — E78.5 DYSLIPIDEMIA: ICD-10-CM

## 2023-05-11 DIAGNOSIS — Z00.00 ANNUAL PHYSICAL EXAM: ICD-10-CM

## 2023-05-11 DIAGNOSIS — Z00.00 ROUTINE PHYSICAL EXAMINATION: Primary | ICD-10-CM

## 2023-05-11 DIAGNOSIS — Z71.85 VACCINE COUNSELING: ICD-10-CM

## 2023-05-11 DIAGNOSIS — R97.20 ELEVATED PSA: ICD-10-CM

## 2023-05-11 LAB
ALBUMIN SERPL BCP-MCNC: 4.3 G/DL (ref 3.5–5.2)
ALP SERPL-CCNC: 97 U/L (ref 55–135)
ALT SERPL W/O P-5'-P-CCNC: 29 U/L (ref 10–44)
ANION GAP SERPL CALC-SCNC: 11 MMOL/L (ref 8–16)
AST SERPL-CCNC: 27 U/L (ref 10–40)
BILIRUB SERPL-MCNC: 0.5 MG/DL (ref 0.1–1)
BUN SERPL-MCNC: 13 MG/DL (ref 8–23)
CALCIUM SERPL-MCNC: 9.6 MG/DL (ref 8.7–10.5)
CHLORIDE SERPL-SCNC: 105 MMOL/L (ref 95–110)
CHOLEST SERPL-MCNC: 209 MG/DL (ref 120–199)
CHOLEST/HDLC SERPL: 4.3 {RATIO} (ref 2–5)
CO2 SERPL-SCNC: 24 MMOL/L (ref 23–29)
COMPLEXED PSA SERPL-MCNC: 6.4 NG/ML (ref 0–4)
CREAT SERPL-MCNC: 0.9 MG/DL (ref 0.5–1.4)
ERYTHROCYTE [DISTWIDTH] IN BLOOD BY AUTOMATED COUNT: 13.5 % (ref 11.5–14.5)
EST. GFR  (NO RACE VARIABLE): >60 ML/MIN/1.73 M^2
ESTIMATED AVG GLUCOSE: 105 MG/DL (ref 68–131)
GLUCOSE SERPL-MCNC: 93 MG/DL (ref 70–110)
HBA1C MFR BLD: 5.3 % (ref 4–5.6)
HCT VFR BLD AUTO: 46.7 % (ref 40–54)
HDLC SERPL-MCNC: 49 MG/DL (ref 40–75)
HDLC SERPL: 23.4 % (ref 20–50)
HGB BLD-MCNC: 15.6 G/DL (ref 14–18)
LDLC SERPL CALC-MCNC: 145 MG/DL (ref 63–159)
MCH RBC QN AUTO: 28.7 PG (ref 27–31)
MCHC RBC AUTO-ENTMCNC: 33.4 G/DL (ref 32–36)
MCV RBC AUTO: 86 FL (ref 82–98)
NONHDLC SERPL-MCNC: 160 MG/DL
PLATELET # BLD AUTO: 361 K/UL (ref 150–450)
PMV BLD AUTO: 8.3 FL (ref 9.2–12.9)
POTASSIUM SERPL-SCNC: 4.4 MMOL/L (ref 3.5–5.1)
PROT SERPL-MCNC: 7.5 G/DL (ref 6–8.4)
RBC # BLD AUTO: 5.44 M/UL (ref 4.6–6.2)
SODIUM SERPL-SCNC: 140 MMOL/L (ref 136–145)
T4 FREE SERPL-MCNC: 0.83 NG/DL (ref 0.71–1.51)
TRIGL SERPL-MCNC: 75 MG/DL (ref 30–150)
TSH SERPL DL<=0.005 MIU/L-ACNC: 0.27 UIU/ML (ref 0.4–4)
WBC # BLD AUTO: 6.01 K/UL (ref 3.9–12.7)

## 2023-05-11 PROCEDURE — 1160F PR REVIEW ALL MEDS BY PRESCRIBER/CLIN PHARMACIST DOCUMENTED: ICD-10-PCS | Mod: CPTII,S$GLB,, | Performed by: INTERNAL MEDICINE

## 2023-05-11 PROCEDURE — 3080F DIAST BP >= 90 MM HG: CPT | Mod: CPTII,S$GLB,, | Performed by: INTERNAL MEDICINE

## 2023-05-11 PROCEDURE — 99396 PR PREVENTIVE VISIT,EST,40-64: ICD-10-PCS | Mod: S$GLB,,, | Performed by: INTERNAL MEDICINE

## 2023-05-11 PROCEDURE — 3008F BODY MASS INDEX DOCD: CPT | Mod: CPTII,S$GLB,, | Performed by: INTERNAL MEDICINE

## 2023-05-11 PROCEDURE — 3044F PR MOST RECENT HEMOGLOBIN A1C LEVEL <7.0%: ICD-10-PCS | Mod: CPTII,S$GLB,, | Performed by: INTERNAL MEDICINE

## 2023-05-11 PROCEDURE — 83036 HEMOGLOBIN GLYCOSYLATED A1C: CPT | Performed by: INTERNAL MEDICINE

## 2023-05-11 PROCEDURE — 3077F PR MOST RECENT SYSTOLIC BLOOD PRESSURE >= 140 MM HG: ICD-10-PCS | Mod: CPTII,S$GLB,, | Performed by: INTERNAL MEDICINE

## 2023-05-11 PROCEDURE — 85027 COMPLETE CBC AUTOMATED: CPT | Mod: PO | Performed by: INTERNAL MEDICINE

## 2023-05-11 PROCEDURE — 3044F HG A1C LEVEL LT 7.0%: CPT | Mod: CPTII,S$GLB,, | Performed by: INTERNAL MEDICINE

## 2023-05-11 PROCEDURE — 3080F PR MOST RECENT DIASTOLIC BLOOD PRESSURE >= 90 MM HG: ICD-10-PCS | Mod: CPTII,S$GLB,, | Performed by: INTERNAL MEDICINE

## 2023-05-11 PROCEDURE — 93010 EKG 12-LEAD: ICD-10-PCS | Mod: ,,, | Performed by: INTERNAL MEDICINE

## 2023-05-11 PROCEDURE — 93010 ELECTROCARDIOGRAM REPORT: CPT | Mod: ,,, | Performed by: INTERNAL MEDICINE

## 2023-05-11 PROCEDURE — 84153 ASSAY OF PSA TOTAL: CPT | Performed by: INTERNAL MEDICINE

## 2023-05-11 PROCEDURE — 1160F RVW MEDS BY RX/DR IN RCRD: CPT | Mod: CPTII,S$GLB,, | Performed by: INTERNAL MEDICINE

## 2023-05-11 PROCEDURE — 3008F PR BODY MASS INDEX (BMI) DOCUMENTED: ICD-10-PCS | Mod: CPTII,S$GLB,, | Performed by: INTERNAL MEDICINE

## 2023-05-11 PROCEDURE — 1159F PR MEDICATION LIST DOCUMENTED IN MEDICAL RECORD: ICD-10-PCS | Mod: CPTII,S$GLB,, | Performed by: INTERNAL MEDICINE

## 2023-05-11 PROCEDURE — 3077F SYST BP >= 140 MM HG: CPT | Mod: CPTII,S$GLB,, | Performed by: INTERNAL MEDICINE

## 2023-05-11 PROCEDURE — 84439 ASSAY OF FREE THYROXINE: CPT | Performed by: INTERNAL MEDICINE

## 2023-05-11 PROCEDURE — 80061 LIPID PANEL: CPT | Performed by: INTERNAL MEDICINE

## 2023-05-11 PROCEDURE — 99999 PR PBB SHADOW E&M-EST. PATIENT-LVL IV: CPT | Mod: PBBFAC,,, | Performed by: INTERNAL MEDICINE

## 2023-05-11 PROCEDURE — 99396 PREV VISIT EST AGE 40-64: CPT | Mod: S$GLB,,, | Performed by: INTERNAL MEDICINE

## 2023-05-11 PROCEDURE — 80053 COMPREHEN METABOLIC PANEL: CPT | Mod: PO | Performed by: INTERNAL MEDICINE

## 2023-05-11 PROCEDURE — 1159F MED LIST DOCD IN RCRD: CPT | Mod: CPTII,S$GLB,, | Performed by: INTERNAL MEDICINE

## 2023-05-11 PROCEDURE — 93005 ELECTROCARDIOGRAM TRACING: CPT | Mod: PO

## 2023-05-11 PROCEDURE — 99999 PR PBB SHADOW E&M-EST. PATIENT-LVL IV: ICD-10-PCS | Mod: PBBFAC,,, | Performed by: INTERNAL MEDICINE

## 2023-05-11 PROCEDURE — 84443 ASSAY THYROID STIM HORMONE: CPT | Performed by: INTERNAL MEDICINE

## 2023-05-11 RX ORDER — AMITRIPTYLINE HYDROCHLORIDE 25 MG/1
25 TABLET, FILM COATED ORAL
COMMUNITY
Start: 2022-06-02

## 2023-05-11 RX ORDER — SILDENAFIL 100 MG/1
100 TABLET, FILM COATED ORAL
COMMUNITY
Start: 2022-12-02

## 2023-05-11 RX ORDER — ATORVASTATIN CALCIUM 10 MG/1
10 TABLET, FILM COATED ORAL
COMMUNITY
Start: 2022-07-14 | End: 2023-05-11

## 2023-05-11 RX ORDER — BUTALBITAL, ACETAMINOPHEN AND CAFFEINE 50; 325; 40 MG/1; MG/1; MG/1
1 TABLET ORAL EVERY 4 HOURS
COMMUNITY
Start: 2023-03-23 | End: 2023-05-11 | Stop reason: SDUPTHER

## 2023-05-11 NOTE — PROGRESS NOTES
May 11, 2023                                                                                                                                                                                                                                                                                      Alex Anderson  [address] [address] [place] [address]                                                                                                                                                                                                                                                                                                RE: Alex Anderson                                                        Clinic #:[id number]                                                                                                                                   Dear  Alex Anderson,                                                                                                                                           Thank you for allowing me to serve you and perform your Executive Health exam on May 11, 2023.   This letter will serve a brief summary of the history, physical findings, and laboratory/studies performed and recommendations at that time.                                                                                         REASON FOR VISIT: Executive Health Preventive Physical Examination    Past Medical History:   Diagnosis Date    High cholesterol        Past Surgical History:   Procedure Laterality Date    FOOT SURGERY      Fork lift accident       Family History   Problem Relation Age of Onset    Brain cancer Father     Arthritis Mother     No Known Problems Brother     No Known Problems Brother        Social History     Socioeconomic History    Marital status:    Tobacco Use    Smoking status: Never    Smokeless tobacco: Never   Substance and Sexual Activity    Alcohol use: Yes       Allergies: Patient has no known  allergies.    Current Outpatient Medications   Medication Sig Dispense Refill    amitriptyline (ELAVIL) 25 MG tablet 25 mg.      atorvastatin (LIPITOR) 10 MG tablet       butalbital-aspirin-caffeine -40 mg (FIORINAL) -40 mg Cap Take 1 capsule by mouth every 4 (four) hours as needed.      sildenafiL (VIAGRA) 100 MG tablet Take 100 mg by mouth.       No current facility-administered medications for this visit.       REVIEW OF SYSTEMS:  No recent changes in weight, or complaints of fatigue. No recent changes in vision, or hearing. Denies frequent headaches.No recent changes in voice. No new or changing skin lesions. Denies abnormal bruising or bleeding.  Denies chest pain or sensation of skipped beats. No new onset of shortness of breath, or dyspnea on exertion. Denies abdominal discomfort, constipation, diarrhea,or blood in stool. Denies difficulty with urination.   No recent joint swelling or muscle discomfort. Denies pain or weakness in extremities. No recent loss of balance. Denies problems with sleep or depression.        Remainder of the review of systems without pertinent positives at this time.                                                                              PHYSICAL EXAM:   VITAL SIGNS: BP (!) 154/96   Pulse 71   Temp 98.5 °F (36.9 °C) (Oral)   Ht 6' (1.829 m)   Wt 88.5 kg (195 lb 1.7 oz)   SpO2 96%   BMI 26.46 kg/m²   GENERAL APPEARANCE:  Well nourished and normally developed,  pleasant 62 y.o. male, in good spirits.  SKIN: Without rashes or overt lesions.  HEENT: Head normacephalic. There was no scleral icterus. Mucous membranes were moist. Dentition. Neck is supple, large left thyroid nodule, no carotid bruits.  LUNGS: Clear to auscultation bilaterally. Normal respiratory effort.  HEART: Exam reveals regular rate and rhythm. First and second heart sounds normal. No murmurs, rubs or gallops.   ABDOMEN: Soft, non-tender, non-distended. Exam reveals normal bowl sounds, no masses,  no organomegaly and no aortic enlargement.    EXTREMITIES:  Non edematous, both femoral and pedal pulses are normal. No joint stiffness or tenderness. Full range of motion and strength, upper and lower bilaterally.    LAB DATA/STUDIES REVIEWED:  LABS:  low TSH, elevated PSA   EKG:  Normal       ASSESSMENT/RECOMMENDATIONS :    At this time,  you appear to be in good medical condition.    Per our conversation, you will see your primary care doctor for the large left thyroid nodule and further address your hyperthyroidism.    Continue following up with your urologist on your elevated PSA.  Monitor your blood pressure at home with your wife's cuff.  If you continue to read high, please see your PCP.    Continue to work on regular exercise, maintenance of a healthy weight, balanced diet rich in fruits/vegetables and lean protein, and avoidance of unhealthy habits like smoking and excessive alcohol intake.  I look forward to seeing you again next year.    Please contact me should you have any questions or concerns regarding physical findings, or my recommendations.       Sincerely yours,         Bashir Andreson M.D.  Department of Internal Medicine  [facility]

## 2023-08-07 ENCOUNTER — TELEPHONE (OUTPATIENT)
Dept: FAMILY MEDICINE | Facility: CLINIC | Age: 63
End: 2023-08-07
Payer: COMMERCIAL